# Patient Record
Sex: MALE | Race: AMERICAN INDIAN OR ALASKA NATIVE | NOT HISPANIC OR LATINO | URBAN - METROPOLITAN AREA
[De-identification: names, ages, dates, MRNs, and addresses within clinical notes are randomized per-mention and may not be internally consistent; named-entity substitution may affect disease eponyms.]

---

## 2018-05-04 ENCOUNTER — OUTPATIENT (OUTPATIENT)
Dept: OUTPATIENT SERVICES | Facility: HOSPITAL | Age: 63
LOS: 1 days | Discharge: HOME | End: 2018-05-04

## 2018-05-07 DIAGNOSIS — R89.7 ABNORMAL HISTOLOGICAL FINDINGS IN SPECIMENS FROM OTHER ORGANS, SYSTEMS AND TISSUES: ICD-10-CM

## 2021-06-02 ENCOUNTER — OUTPATIENT (OUTPATIENT)
Dept: OUTPATIENT SERVICES | Facility: HOSPITAL | Age: 66
LOS: 1 days | Discharge: HOME | End: 2021-06-02

## 2021-06-02 DIAGNOSIS — Z11.59 ENCOUNTER FOR SCREENING FOR OTHER VIRAL DISEASES: ICD-10-CM

## 2021-06-05 ENCOUNTER — OUTPATIENT (OUTPATIENT)
Dept: OUTPATIENT SERVICES | Facility: HOSPITAL | Age: 66
LOS: 1 days | Discharge: HOME | End: 2021-06-05
Payer: MEDICARE

## 2021-06-05 DIAGNOSIS — R97.20 ELEVATED PROSTATE SPECIFIC ANTIGEN [PSA]: ICD-10-CM

## 2021-06-05 PROCEDURE — 72197 MRI PELVIS W/O & W/DYE: CPT | Mod: 26,MH

## 2023-08-07 ENCOUNTER — APPOINTMENT (OUTPATIENT)
Dept: RADIOLOGY | Facility: CLINIC | Age: 68
End: 2023-08-07

## 2023-08-07 ENCOUNTER — APPOINTMENT (OUTPATIENT)
Dept: PAIN MANAGEMENT | Facility: CLINIC | Age: 68
End: 2023-08-07
Payer: MEDICARE

## 2023-08-07 VITALS
WEIGHT: 155 LBS | HEIGHT: 70 IN | DIASTOLIC BLOOD PRESSURE: 77 MMHG | BODY MASS INDEX: 22.19 KG/M2 | HEART RATE: 76 BPM | SYSTOLIC BLOOD PRESSURE: 109 MMHG

## 2023-08-07 DIAGNOSIS — M54.16 RADICULOPATHY, LUMBAR REGION: ICD-10-CM

## 2023-08-07 DIAGNOSIS — G89.29 RADICULOPATHY, LUMBAR REGION: ICD-10-CM

## 2023-08-07 PROBLEM — Z00.00 ENCOUNTER FOR PREVENTIVE HEALTH EXAMINATION: Status: ACTIVE | Noted: 2023-08-07

## 2023-08-07 PROCEDURE — 72110 X-RAY EXAM L-2 SPINE 4/>VWS: CPT

## 2023-08-07 PROCEDURE — 99204 OFFICE O/P NEW MOD 45 MIN: CPT

## 2023-08-07 RX ORDER — FINASTERIDE 5 MG/1
5 TABLET, FILM COATED ORAL
Refills: 0 | Status: ACTIVE | COMMUNITY

## 2023-08-07 RX ORDER — TAMSULOSIN HYDROCHLORIDE 0.4 MG/1
0.4 CAPSULE ORAL
Refills: 0 | Status: ACTIVE | COMMUNITY

## 2023-08-07 RX ORDER — TAMSULOSIN HYDROCHLORIDE 0.4 MG/1
CAPSULE ORAL
Refills: 0 | Status: ACTIVE | COMMUNITY

## 2023-08-07 RX ORDER — GABAPENTIN 600 MG/1
600 TABLET, COATED ORAL
Refills: 0 | Status: ACTIVE | COMMUNITY

## 2023-08-07 NOTE — PHYSICAL EXAM
[de-identified] : BACK - tenderness into the lumbar paraspinals. ROM restricted. Pain with flexion. Positive SLR on the left.

## 2023-08-07 NOTE — HISTORY OF PRESENT ILLNESS
[FreeTextEntry1] : Mr. Maciel is a 68 year old male presenting to our walk-in clinic to establish care for his lower back pain. He states on 7- he fell off a ladder injuring his lower back. He denies hitting his head.  He is presenting with worsening of his usual chronic lower back pain.  He has occasional radiation into the left thigh with numbness and tingling.  He currently rates the pain at a 8/10 on the pain scale. He states the pain is constant in nature and daily. He denies any changes in bowel/bladder havits or any other red flags.

## 2023-08-07 NOTE — ASSESSMENT
[FreeTextEntry1] : 68 year old male presenting with worsening of his chronic lower back pain. I will like to obtain a X-ray of the lumbar spine and MRI of the lumbar spine due to worsening symptoms. He will follow up in 2 weeks for reassessment.   Will order a lumbar spine, 4 view, x-ray due to pain and decrease in range of motion in that area to delineate a pain generator.   MRI of the lumbar spine was ordered to delineate spine pathology such as disc displacement and stenosis.   I will also have the patient receive a Lumbar LSO brace to reduce pain by restricting mobility of the trunk.   Entered by Ning Gallardo, acting as scribe for Dr. Kovacs.   The documentation recorded by the scribe, in my presence, accurately reflects the service I personally performed, and the decisions made by me with my edits as appropriate.   Best Regards,  Sim Kovacs MD  Board Certified, Anesthesiology  Board Certified, Pain Medicine

## 2023-08-24 ENCOUNTER — APPOINTMENT (OUTPATIENT)
Dept: PAIN MANAGEMENT | Facility: CLINIC | Age: 68
End: 2023-08-24
Payer: MEDICARE

## 2023-08-24 VITALS
DIASTOLIC BLOOD PRESSURE: 74 MMHG | HEART RATE: 84 BPM | BODY MASS INDEX: 22.19 KG/M2 | HEIGHT: 70 IN | WEIGHT: 155 LBS | SYSTOLIC BLOOD PRESSURE: 117 MMHG

## 2023-08-24 DIAGNOSIS — M47.816 SPONDYLOSIS W/OUT MYELOPATHY OR RADICULOPATHY, LUMBAR REGION: ICD-10-CM

## 2023-08-24 PROCEDURE — 99214 OFFICE O/P EST MOD 30 MIN: CPT

## 2023-08-24 NOTE — ASSESSMENT
[FreeTextEntry1] : 68 year old male presenting with much improved lower back pain. We discussed the imaging findings. At this point, no interventions are recommended. Should he have a flare up he can follow up for possible treatments.   Entered by Ning Gallardo, acting as scribe for Dr. Kovacs.   The documentation recorded by the scribe, in my presence, accurately reflects the service I personally performed, and the decisions made by me with my edits as appropriate.   Best Regards,  Sim Kovacs MD  Board Certified, Anesthesiology  Board Certified, Pain Medicine

## 2023-08-24 NOTE — HISTORY OF PRESENT ILLNESS
[FreeTextEntry1] : Mr. Maciel is a 68 year old male presenting to our walk-in clinic to establish care for his lower back pain. He states on 7- he fell off a ladder injuring his lower back. He denies hitting his head.  He is presenting with worsening of his usual chronic lower back pain.  He has occasional radiation into the left thigh with numbness and tingling.  He currently rates the pain at a 8/10 on the pain scale. He states the pain is constant in nature and daily. He denies any changes in bowel/bladder havits or any other red flags.  TODAY: Revisit encounter.   He is presenting with much improved lower back pain.  He states pain ranges anywhere from 1-3 out of 10 on the pain scale.  He is states he is able to ride his bike and walk for extended periods of time.  Denies any radicular symptoms.

## 2023-08-24 NOTE — DATA REVIEWED
[FreeTextEntry1] : MRI of the lumbar spine taken on 8-8-2023 showed mild acute to subacute compression fracture at L`1. Moderate curvature of the lumbar sine convex to the left with marked disc degeneration. Marked spinal stneosis at L4-5 marked right and mild left foraminal compromise. Mild to moderate spinals stenosis at L3-4 with marked right foraminal compromise. Mild retrolisthesis at L2-3 with moderate bilateral foraminal compromise. Mild retrolistehsis at L1-2 with moderate left foraminal compromise. Diffuse disc bulge at L5-S1 with mild bilateral formainal compromise.   X-ray of the lumbar spine taken on 8/7/2023 showed levoscoliosis.  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  Bilateral sacroiliac joint arthritis.  Surgical hemostatic clips seen within the left pelvis.

## 2025-05-02 ENCOUNTER — APPOINTMENT (OUTPATIENT)
Facility: CLINIC | Age: 70
End: 2025-05-02